# Patient Record
Sex: FEMALE | Race: WHITE
[De-identification: names, ages, dates, MRNs, and addresses within clinical notes are randomized per-mention and may not be internally consistent; named-entity substitution may affect disease eponyms.]

---

## 2022-11-06 ENCOUNTER — HOSPITAL ENCOUNTER (EMERGENCY)
Dept: HOSPITAL 75 - ER | Age: 77
LOS: 1 days | Discharge: HOME | End: 2022-11-07
Payer: COMMERCIAL

## 2022-11-06 DIAGNOSIS — R07.89: Primary | ICD-10-CM

## 2022-11-06 DIAGNOSIS — R00.0: ICD-10-CM

## 2022-11-06 PROCEDURE — 83690 ASSAY OF LIPASE: CPT

## 2022-11-06 PROCEDURE — 85025 COMPLETE CBC W/AUTO DIFF WBC: CPT

## 2022-11-06 PROCEDURE — 93041 RHYTHM ECG TRACING: CPT

## 2022-11-06 PROCEDURE — 85379 FIBRIN DEGRADATION QUANT: CPT

## 2022-11-06 PROCEDURE — 84484 ASSAY OF TROPONIN QUANT: CPT

## 2022-11-06 PROCEDURE — 85610 PROTHROMBIN TIME: CPT

## 2022-11-06 PROCEDURE — 93005 ELECTROCARDIOGRAM TRACING: CPT

## 2022-11-06 PROCEDURE — 85730 THROMBOPLASTIN TIME PARTIAL: CPT

## 2022-11-06 PROCEDURE — 36415 COLL VENOUS BLD VENIPUNCTURE: CPT

## 2022-11-06 PROCEDURE — 80053 COMPREHEN METABOLIC PANEL: CPT

## 2022-11-06 PROCEDURE — 83874 ASSAY OF MYOGLOBIN: CPT

## 2022-11-06 PROCEDURE — 71045 X-RAY EXAM CHEST 1 VIEW: CPT

## 2022-11-06 PROCEDURE — 83735 ASSAY OF MAGNESIUM: CPT

## 2022-11-07 VITALS — SYSTOLIC BLOOD PRESSURE: 160 MMHG | DIASTOLIC BLOOD PRESSURE: 87 MMHG

## 2022-11-07 LAB
ALBUMIN SERPL-MCNC: 4.3 GM/DL (ref 3.2–4.5)
ALP SERPL-CCNC: 60 U/L (ref 40–136)
ALT SERPL-CCNC: 23 U/L (ref 0–55)
APTT BLD: 33 SEC (ref 24–35)
BASOPHILS # BLD AUTO: 0.1 10^3/UL (ref 0–0.1)
BASOPHILS NFR BLD AUTO: 1 % (ref 0–10)
BILIRUB SERPL-MCNC: 0.4 MG/DL (ref 0.1–1)
BUN/CREAT SERPL: 19
CALCIUM SERPL-MCNC: 9.9 MG/DL (ref 8.5–10.1)
CHLORIDE SERPL-SCNC: 104 MMOL/L (ref 98–107)
CO2 SERPL-SCNC: 20 MMOL/L (ref 21–32)
CREAT SERPL-MCNC: 1.1 MG/DL (ref 0.6–1.3)
EOSINOPHIL # BLD AUTO: 0.2 10^3/UL (ref 0–0.3)
EOSINOPHIL NFR BLD AUTO: 2 % (ref 0–10)
GFR SERPLBLD BASED ON 1.73 SQ M-ARVRAT: 52 ML/MIN
GLUCOSE SERPL-MCNC: 170 MG/DL (ref 70–105)
HCT VFR BLD CALC: 37 % (ref 35–52)
HGB BLD-MCNC: 12.8 G/DL (ref 11.5–16)
INR PPP: 1 (ref 0.8–1.4)
LIPASE SERPL-CCNC: 28 U/L (ref 8–78)
LYMPHOCYTES # BLD AUTO: 1.6 10^3/UL (ref 1–4)
LYMPHOCYTES NFR BLD AUTO: 22 % (ref 12–44)
MAGNESIUM SERPL-MCNC: 2.1 MG/DL (ref 1.6–2.4)
MANUAL DIFFERENTIAL PERFORMED BLD QL: NO
MCH RBC QN AUTO: 30 PG (ref 25–34)
MCHC RBC AUTO-ENTMCNC: 34 G/DL (ref 32–36)
MCV RBC AUTO: 88 FL (ref 80–99)
MONOCYTES # BLD AUTO: 0.5 10^3/UL (ref 0–1)
MONOCYTES NFR BLD AUTO: 7 % (ref 0–12)
NEUTROPHILS # BLD AUTO: 5 10^3/UL (ref 1.8–7.8)
NEUTROPHILS NFR BLD AUTO: 68 % (ref 42–75)
PLATELET # BLD: 255 10^3/UL (ref 130–400)
PMV BLD AUTO: 9.9 FL (ref 9–12.2)
POTASSIUM SERPL-SCNC: 3.7 MMOL/L (ref 3.6–5)
PROT SERPL-MCNC: 6.5 GM/DL (ref 6.4–8.2)
PROTHROMBIN TIME: 13 SEC (ref 12.2–14.7)
SODIUM SERPL-SCNC: 137 MMOL/L (ref 135–145)
WBC # BLD AUTO: 7.4 10^3/UL (ref 4.3–11)

## 2022-11-07 RX ADMIN — NITROGLYCERIN PRN MG: 0.4 TABLET SUBLINGUAL at 01:23

## 2022-11-07 RX ADMIN — NITROGLYCERIN PRN MG: 0.4 TABLET SUBLINGUAL at 01:01

## 2022-11-07 NOTE — DIAGNOSTIC IMAGING REPORT
EXAMINATION: Chest 1 view



HISTORY: Chest pain



COMPARISON: None available.



FINDINGS: 



Heart size and pulmonary vasculature are normal. The lungs are

clear without consolidation, pleural effusion, or pneumothorax.

Degenerative changes of the thoracic spine. Osseous structures

are otherwise intact.



IMPRESSION: 



1. No acute radiographic abnormality in the chest.



Dictated by: 



  Dictated on workstation # [id number]

## 2022-11-07 NOTE — ED CHEST PAIN
General


Chief Complaint:  Chest Pain


Stated Complaint:  CHEST DISCOMFORT


Source:  patient


Exam Limitations:  no limitations





History of Present Illness


Date Seen by Provider:  Nov 7, 2022


Time Seen by Provider:  00:10


Initial Comments


Here with report of onset of central chest tightness that started between 9 and 

10 PM.  Came in because it persisted.  Denies shortness of breath, sweating or 

weakness but did have some dizziness that she noted yesterday that was interm

ittent and brief.  She is having 6 out of 10 pain currently.  It is 

nonradiating.  She has never had anything like this before.  She does have 

history of high blood pressure.  Denies any recent long trips or plane rides or 

recent trauma or surgery.  Denies pain to her legs.


Timing/Duration:  1-3 hours


Severity/Quality:  moderate, tightness


Location:  central


Radiation:  no radiation


Activities at Onset:  none


Prior CP/Workup:  no prior chest pain, no prior cardiac workup


ASA po PTA:  No


NTG SL PTA:  No


Associated Symptoms:  No abdominal pain, No back pain; dizziness; No edema, No 

fatigue, No fever/chills, No nausea/vomiting, No shortness of breath, No 

swelling/lump in chest, No weakness





Allergies and Home Medications


Allergies


Coded Allergies:  


     No Known Drug Allergies (Unverified , 11/7/22)





Patient Home Medication List


Home Medication List Reviewed:  Yes





Review of Systems


Review of Systems


Constitutional:  see HPI; No chills, No fever


EENTM:  No Symptoms Reported


Respiratory:  Denies Cough, Denies Shortness of Air


Cardiovascular:  Chest Pain; Denies Edema, Denies Irregular Heart Rate


Gastrointestinal:  Denies Abdominal Pain, Denies Nausea, Denies Vomiting


Genitourinary:  No Symptoms Reported


Musculoskeletal:  No back pain, No muscle pain


Skin:  no symptoms reported





All Other Systems Reviewed


Negative Unless Noted:  Yes





Past Medical-Social-Family Hx


Patient Social History


Tobacco Use?:  No


Use of E-Cig and/or Vaping dev:  No


Substance use?:  No


Alcohol Use?:  No





Past Medical History


Surgeries:  Yes


Orthopedic


Respiratory:  No


Cardiac:  Yes


Hypertension


Neurological:  No


Genitourinary:  No


Gastrointestinal:  No


Musculoskeletal:  No


Endocrine:  No





Family Medical History


Reviewed Nursing Family Hx





Physical Exam


Vital Signs





Vital Signs - First Documented








 11/6/22





 23:45


 


Temp 36.6


 


Pulse 96


 


Resp 16


 


B/P (MAP) 183/86 (118)


 


Pulse Ox 94


 


O2 Delivery Room Air





Capillary Refill :


Height, Weight, BMI


Height: '"


Weight: lbs. oz. kg;  BMI


Method:


General Appearance:  No Apparent Distress, WD/WN


HEENT:  PERRL/EOMI, Pharynx Normal


Neck:  Non Tender, Supple


Respiratory:  Lungs Clear, Normal Breath Sounds


Cardiovascular:  No Murmur, Tachycardia


Gastrointestinal:  Non Tender, Soft


Extremity:  Normal Inspection, Normal Range of Motion, Non Tender, No Calf 

Tenderness


Neurologic/Psychiatric:  Alert, Oriented x3, No Motor/Sensory Deficits


Skin:  Normal Color, Warm/Dry





Progress/Results/Core Measures


Results/Orders


Lab Results





Laboratory Tests








Test


 11/7/22


00:38 11/7/22


02:44 Range/Units


 


 


White Blood Count


 7.4 


 


 4.3-11.0


10^3/uL


 


Red Blood Count


 4.22 


 


 3.80-5.11


10^6/uL


 


Hemoglobin 12.8   11.5-16.0  g/dL


 


Hematocrit 37   35-52  %


 


Mean Corpuscular Volume 88   80-99  fL


 


Mean Corpuscular Hemoglobin 30   25-34  pg


 


Mean Corpuscular Hemoglobin


Concent 34 


 


 32-36  g/dL





 


Red Cell Distribution Width 12.9   10.0-14.5  %


 


Platelet Count


 255 


 


 130-400


10^3/uL


 


Mean Platelet Volume 9.9   9.0-12.2  fL


 


Immature Granulocyte % (Auto) 0    %


 


Neutrophils (%) (Auto) 68   42-75  %


 


Lymphocytes (%) (Auto) 22   12-44  %


 


Monocytes (%) (Auto) 7   0-12  %


 


Eosinophils (%) (Auto) 2   0-10  %


 


Basophils (%) (Auto) 1   0-10  %


 


Neutrophils # (Auto)


 5.0 


 


 1.8-7.8


10^3/uL


 


Lymphocytes # (Auto)


 1.6 


 


 1.0-4.0


10^3/uL


 


Monocytes # (Auto)


 0.5 


 


 0.0-1.0


10^3/uL


 


Eosinophils # (Auto)


 0.2 


 


 0.0-0.3


10^3/uL


 


Basophils # (Auto)


 0.1 


 


 0.0-0.1


10^3/uL


 


Immature Granulocyte # (Auto)


 0.0 


 


 0.0-0.1


10^3/uL


 


Prothrombin Time 13.0   12.2-14.7  SEC


 


INR Comment 1.0   0.8-1.4  


 


Activated Partial


Thromboplast Time 33 


 


 24-35  SEC





 


D-Dimer


 0.73 H


 


 0.00-0.49


UG/ML


 


Sodium Level 137   135-145  MMOL/L


 


Potassium Level 3.7   3.6-5.0  MMOL/L


 


Chloride Level 104     MMOL/L


 


Carbon Dioxide Level 20 L  21-32  MMOL/L


 


Anion Gap 13   5-14  MMOL/L


 


Blood Urea Nitrogen 21 H  7-18  MG/DL


 


Creatinine


 1.10 


 


 0.60-1.30


MG/DL


 


Estimat Glomerular Filtration


Rate 52 


 


  





 


BUN/Creatinine Ratio 19    


 


Glucose Level 170 H    MG/DL


 


Calcium Level 9.9   8.5-10.1  MG/DL


 


Corrected Calcium 9.7   8.5-10.1  MG/DL


 


Magnesium Level 2.1   1.6-2.4  MG/DL


 


Total Bilirubin 0.4   0.1-1.0  MG/DL


 


Aspartate Amino Transf


(AST/SGOT) 20 


 


 5-34  U/L





 


Alanine Aminotransferase


(ALT/SGPT) 23 


 


 0-55  U/L





 


Alkaline Phosphatase 60     U/L


 


Myoglobin


 77.6 


 


 10.0-92.0


NG/ML


 


Troponin I < 0.028  < 0.028  <0.028  NG/ML


 


Total Protein 6.5   6.4-8.2  GM/DL


 


Albumin 4.3   3.2-4.5  GM/DL


 


Lipase 28   8-78  U/L








My Orders





Orders - ASUNCION TRIPP MD


Ekg Tracing (11/6/22 23:42)


Cbc With Automated Diff (11/7/22 00:19)


Magnesium (11/7/22 00:19)


Chest 1 View, Ap/Pa Only (11/7/22 00:19)


Comprehensive Metabolic Panel (11/7/22 00:19)


Myoglobin Serum (11/7/22 00:19)


Protime With Inr (11/7/22 00:19)


Partial Thromboplastin Time (11/7/22 00:19)


O2 (11/7/22 00:19)


Monitor-Rhythm Ecg Trace Only (11/7/22 00:19)


Lipid Panel (11/8/22 06:00)


Ed Iv/Invasive Line Start (11/7/22 00:19)


Lipase (11/7/22 00:19)


Fibrin Degradation Products (11/7/22 00:19)


Troponin I Kellie (11/7/22 00:19)


Nitroglycerin 0.4 Mg Btl 25's (Nitrostat (11/7/22 00:30)


Aspirin Chewable Tablet (Baby Aspirin Ch (11/7/22 00:30)


Ed Iv/Invasive Line Start (11/7/22 00:19)


Ns Iv 500 Ml (Sodium Chloride 0.9%) (11/7/22 00:30)


Troponin I [place] (11/7/22 02:40)





Medications Given in ED





Current Medications








 Medications  Dose


 Ordered  Sig/Sonia


 Route  Start Time


 Stop Time Status Last Admin


Dose Admin


 


 Aspirin  324 mg  ONCE  ONCE


 PO  11/7/22 00:30


 11/7/22 00:31 DC 11/7/22 00:45


324 MG


 


 Nitroglycerin  0.4 mg  UD  PRN


 SL  11/7/22 00:30


    11/7/22 01:23


0.4 MG


 


 Sodium Chloride  500 ml @ 0


 mls/hr  Q0M ONCE


 IV  11/7/22 00:30


 11/7/22 00:31 DC 11/7/22 00:59


999 MLS/HR








Vital Signs/I&O











 11/6/22





 23:45


 


Temp 36.6


 


Pulse 96


 


Resp 16


 


B/P (MAP) 183/86 (118)


 


Pulse Ox 94


 


O2 Delivery Room Air











Progress


Progress Note :  


Progress Note


Seen and evaluated.  IV, labs, EKG and chest x-ray ordered.  We will go ahead 

and do aspirin 324 mg p.o. as well as nitro sublingual to ease chest pain.  

Normal saline 500 mL bolus ordered due to tachycardia.  Monitor patient.  0138: 

Patient is pain-free.  Initial troponin is negative.  She is resting peacefully.

 Given her age and onset of symptoms, we will repeat troponin at around 0240.  

This will give us 2 hours since previous drawn approximately 5 to 6 hours since 

onset of pain.  Discussed with patient who agrees.  Monitor patient.  0323: 

Repeat troponin is negative.  Patient remains pain-free.  I do think she would 

benefit from cardiology follow-up and I will give her the name for Dr. Simmons to 

follow-up with as he is on-call tonight.  She will make the call in the morning 

for follow-up appointment.  She will also follow-up with her primary care 

doctor.  Discharged home with return precautions.  Patient verbalized 

understanding of instructions and agreement with plan.


Initial ECG Impression Date:  Nov 7, 2022


Initial ECG Impression Time:  00:00


Initial ECG Rate:  96


Initial ECG Rhythm:  S.Tach


Comment


Sinus rhythm with left axis deviation.  Left axis noted.  No evidence of ST 

elevation MI.  Flat T waves inferior.  Interpreted by me.  No previous available

for comparison.





Diagnostic Imaging





   Diagonstic Imaging:  Xray


   Plain Films/CT/US/NM/MRI:  chest


Comments


Single view chest x-ray shows no acute findings.  Interpreted by me.  See full 

report for details.  Pending radiology report.


   Reviewed:  Reviewed by Me





Departure


Impression





   Primary Impression:  


   Chest pain


   Qualified Codes:  R07.9 - Chest pain, unspecified


Disposition:  01 HOME, SELF-CARE


Condition:  Improved





Departure-Patient Inst.


Decision time for Depature:  03:24


Referrals:  


REBECCA SIMMONS MD





NO,LOCAL PHYSICIAN (PCP)


Primary Care Physician


Patient Instructions:  Chest Pain (DC)





Add. Discharge Instructions:  








All discharge instructions reviewed with patient and/or family. Voiced und

erstanding.





Continue home medications as previously prescribed.  Follow-up with your doctor 

this week for recheck and further evaluation.  It is very important that you 

follow-up with the cardiologist listed or of your choosing this week for recheck

and further evaluation.  Call Dr. Simmons's office in the morning and let them 

know that you were seen in the emergency department and we have recommended 

follow-up this week for recheck and further evaluation with him for the chest 

pain.  They should get you in this week.  Return for worse pain, fever, 

vomiting, weakness, breathing problems, sweating, dizziness or other concerns as

needed.











ASUNCION TRIPP MD           Nov 7, 2022 00:24